# Patient Record
Sex: MALE | Race: WHITE
[De-identification: names, ages, dates, MRNs, and addresses within clinical notes are randomized per-mention and may not be internally consistent; named-entity substitution may affect disease eponyms.]

---

## 2019-03-21 ENCOUNTER — HOSPITAL ENCOUNTER (OUTPATIENT)
Dept: HOSPITAL 96 - M.NUC | Age: 39
End: 2019-03-21
Attending: FAMILY MEDICINE
Payer: COMMERCIAL

## 2019-03-21 DIAGNOSIS — R10.11: ICD-10-CM

## 2019-03-21 DIAGNOSIS — R19.8: Primary | ICD-10-CM

## 2019-04-05 ENCOUNTER — HOSPITAL ENCOUNTER (OUTPATIENT)
Dept: HOSPITAL 96 - M.SUR | Age: 39
Discharge: HOME | End: 2019-04-05
Attending: SURGERY
Payer: COMMERCIAL

## 2019-04-05 DIAGNOSIS — K81.1: Primary | ICD-10-CM

## 2019-04-05 DIAGNOSIS — Z79.82: ICD-10-CM

## 2019-04-05 DIAGNOSIS — Z79.899: ICD-10-CM

## 2019-04-05 DIAGNOSIS — Z79.891: ICD-10-CM

## 2019-04-05 LAB
ALBUMIN SERPL-MCNC: 3.1 G/DL (ref 3.4–5)
ALP SERPL-CCNC: 60 U/L (ref 46–116)
ALT SERPL-CCNC: 60 U/L (ref 30–65)
ANION GAP SERPL CALC-SCNC: 7 MMOL/L (ref 7–16)
AST SERPL-CCNC: 29 U/L (ref 15–37)
BILIRUB SERPL-MCNC: 0.2 MG/DL
BUN SERPL-MCNC: 16 MG/DL (ref 7–18)
CALCIUM SERPL-MCNC: 8.3 MG/DL (ref 8.5–10.1)
CHLORIDE SERPL-SCNC: 108 MMOL/L (ref 98–107)
CO2 SERPL-SCNC: 27 MMOL/L (ref 21–32)
CREAT SERPL-MCNC: 0.8 MG/DL (ref 0.6–1.3)
GLUCOSE SERPL-MCNC: 103 MG/DL (ref 70–99)
POTASSIUM SERPL-SCNC: 4.1 MMOL/L (ref 3.5–5.1)
PROT SERPL-MCNC: 6 G/DL (ref 6.4–8.2)
SODIUM SERPL-SCNC: 142 MMOL/L (ref 136–145)

## 2019-04-05 NOTE — OP
[facility] 
[address] [place]  [address]                    OPERATIVE REPORT              
_______________________________________________________________________________
 
Name:       ELIUD HUTSON III           Room:                      [facility]#:  [id number]      Account #:      [id number]  
Admission:  04/05/19     Attend Phys:    Tyler Barcenas
Discharge:               Date of Birth:  [birth year]  
         Report #: [id number]
                                                                     [id number]  
_______________________________________________________________________________
THIS REPORT FOR:  //name//                      
 
CC: Tyler Fox
 
DATE OF SERVICE:  04/05/2019
 
 
PREOPERATIVE DIAGNOSIS:  Chronic cholecystitis.
 
POSTOPERATIVE DIAGNOSIS:  Chronic cholecystitis.
 
OPERATION:  Laparoscopic cholecystectomy.
 
SURGEON:  Tyler Barcenas MD
 
ANESTHESIA:  General.
 
ESTIMATED BLOOD LOSS:  Minimal.
 
SPECIMEN:  Gallbladder.
 
DESCRIPTION OF PROCEDURE:  After informed consent was obtained, the patient was
brought to the operating room and placed supine.  SCDs were placed and working. 
Preoperative antibiotics were administered, general anesthesia was induced.  The
abdomen was prepped and draped in the usual sterile fashion.  A 10 mm incision
was made above the umbilicus.  Fascia was incised.  Trocar was placed. 
Pneumoperitoneum was established.  Three right upper quadrant 5 mm ports were
placed.  Gallbladder was grasped at the fundus and retracted cephalad. 
Infundibulum was grasped and retracted laterally.  I dissected out the cystic
duct and cystic artery.  Cystic duct and artery were clipped and ligated leaving
2 clips in the remaining duct and one in the remaining artery.  Gallbladder was
then taken off the liver bed with electrocautery.  It was placed into an
Endopouch and removed.  The fascia was closed with a figure-of-eight 0 Vicryl. 
Skin was closed with 4-0 Monocryl.  Incisions were sealed with Dermabond.
 
COMPLICATIONS:  None.
 
DISPOSITION:  The patient was taken to recovery room in satisfactory condition.
 
 
 
 
 
                       
                                        By:                                
                 
D: 04/05/19 0838_______________________________________
T: 04/05/19 1008Joneva Barcenas MD        /nt

## 2019-04-08 NOTE — PATH
[address]  [address]                    PATHOLOGY RPT PROCEDURE       
_______________________________________________________________________________
 
Name:       EVERETT PARSONS III           Room:                      [facility].#:  [id number]      Account #:      [id number]  
Admission:  04/05/19     Date of Birth:  [birth year]  
Discharge:                             Report #:    [id number]
                                                         Path Case #: [id number]
_______________________________________________________________________________
 
[facility] Accession Number: [id number]
.                                                                01
Material submitted:                                        .
GALLBLADDER
.                                                                01
Clinical history:                                          .
Chronic cholecystitis
.                                                                02
**********************************************************************
Diagnosis:
Gallbladder, cholecystectomy:
- Mild chronic cholecystitis.
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
- Cholelithiasis.
- Reactive lymph node.
(IUV:jerald; 04/08/2019)
QMS/04/08/2019
**********************************************************************
.                                                                02
Electronically signed:                                     .
Kriss Ahuja MD, Pathologist
NPI- [id number]
.                                                                01
Gross description:                                         .
Received in formalin labeled "Everett Parsons III, gallbladder," is an
intact gallbladder measuring 7.6 x 2.9 x 2.0 cm in greatest dimensions.
The serosal surface is smooth to shaggy and pale pink-tan in appearance,
partially covered in yellow adipose tissue.  A tan possible lymph node is
noted near the infundibulum, measuring 1.1 x 0.6 x 0.5 cm.  Opening the
specimen reveals a granular, pale pink-tan mucosa measuring 0.1 cm in
thickness with a gallbladder wall thickness of up to 0.2 cm.  Multiple
pale yellow possible polyps are noted, ranging from 0.1 to 0.2 cm in
maximum dimension and extending to within 0.5 cm of the infundibulum.
Calculi are present within the specimen that are granular and yellow-green
in appearance, ranging from 0.1 to 0.7 cm in maximum dimension.
Representative sections of the infundibulum, body and fundus are submitted
in cassette A1, to include the aforementioned possible polyps.  The entire
bisected possible lymph node is submitted in cassette A2.
([facility]; 4/5/2019)
XDC/XDC
.                                                                02
Pathologist provided ICD-10:
K80.10, K82.4
.                                                                02
CPT                                                        .
260276
Specimen Comment: Report sent to  / DR BARKER
***Performed at:  01
 
[place] [address]                    PATHOLOGY RPT PROCEDURE       
_______________________________________________________________________________
 
Name:       EVERETT PARSONS III           Room:                      [facility]#:  [id number]      Account #:      [id number]  
Admission:  04/05/19     Date of Birth:  [birth year]  
Discharge:                             Report #:    [id number]
                                                         Path Case #: [id number]
_______________________________________________________________________________
   [facility]
   [address], [place], [place]  [id number]
   MD Candelario Ruffin MD Phone:  [phone]
***Performed at:  02
   [address]  [id number]
   MD Kriss Ahuja MD Phone:  [phone]

## 2021-02-14 ENCOUNTER — HOSPITAL ENCOUNTER (EMERGENCY)
Dept: HOSPITAL 96 - M.ERS | Age: 41
Discharge: HOME | End: 2021-02-14
Payer: COMMERCIAL

## 2021-02-14 VITALS — DIASTOLIC BLOOD PRESSURE: 55 MMHG | SYSTOLIC BLOOD PRESSURE: 125 MMHG

## 2021-02-14 VITALS — HEIGHT: 70 IN | WEIGHT: 270 LBS | BODY MASS INDEX: 38.65 KG/M2

## 2021-02-14 DIAGNOSIS — N20.1: Primary | ICD-10-CM

## 2021-02-14 DIAGNOSIS — E78.00: ICD-10-CM

## 2021-02-14 LAB
ABSOLUTE BASOPHILS: 0.2 THOU/UL (ref 0–0.2)
ABSOLUTE EOSINOPHILS: 0.5 THOU/UL (ref 0–0.7)
ABSOLUTE MONOCYTES: 1.4 THOU/UL (ref 0–1.2)
ALBUMIN SERPL-MCNC: 3.9 G/DL (ref 3.4–5)
ALP SERPL-CCNC: 76 U/L (ref 46–116)
ALT SERPL-CCNC: 47 U/L (ref 30–65)
ANION GAP SERPL CALC-SCNC: 13 MMOL/L (ref 7–16)
AST SERPL-CCNC: 28 U/L (ref 15–37)
BASOPHILS NFR BLD AUTO: 1.4 %
BILIRUB SERPL-MCNC: 0.2 MG/DL
BUN SERPL-MCNC: 15 MG/DL (ref 7–18)
CALCIUM SERPL-MCNC: 9 MG/DL (ref 8.5–10.1)
CHLORIDE SERPL-SCNC: 104 MMOL/L (ref 98–107)
CO2 SERPL-SCNC: 25 MMOL/L (ref 21–32)
CREAT SERPL-MCNC: 1.3 MG/DL (ref 0.6–1.3)
EOSINOPHIL NFR BLD: 3.1 %
GLUCOSE SERPL-MCNC: 136 MG/DL (ref 70–99)
GRANULOCYTES NFR BLD MANUAL: 66.1 %
HCT VFR BLD CALC: 41 % (ref 42–52)
HGB BLD-MCNC: 13.8 GM/DL (ref 14–18)
LYMPHOCYTES # BLD: 3.3 THOU/UL (ref 0.8–5.3)
LYMPHOCYTES NFR BLD AUTO: 20.8 %
MCH RBC QN AUTO: 30.8 PG (ref 26–34)
MCHC RBC AUTO-ENTMCNC: 33.6 G/DL (ref 28–37)
MCV RBC: 91.8 FL (ref 80–100)
MONOCYTES NFR BLD: 8.6 %
MPV: 8.2 FL. (ref 7.2–11.1)
NEUTROPHILS # BLD: 10.6 THOU/UL (ref 1.6–8.1)
NUCLEATED RBCS: 0 /100WBC
PLATELET COUNT*: 309 THOU/UL (ref 150–400)
POTASSIUM SERPL-SCNC: 4.4 MMOL/L (ref 3.5–5.1)
PROT SERPL-MCNC: 7.2 G/DL (ref 6.4–8.2)
RBC # BLD AUTO: 4.47 MIL/UL (ref 4.5–6)
RDW-CV: 13.1 % (ref 10.5–14.5)
SODIUM SERPL-SCNC: 142 MMOL/L (ref 136–145)
WBC # BLD AUTO: 16 THOU/UL (ref 4–11)